# Patient Record
Sex: MALE | Race: BLACK OR AFRICAN AMERICAN | NOT HISPANIC OR LATINO | Employment: OTHER | URBAN - METROPOLITAN AREA
[De-identification: names, ages, dates, MRNs, and addresses within clinical notes are randomized per-mention and may not be internally consistent; named-entity substitution may affect disease eponyms.]

---

## 2020-11-27 ENCOUNTER — OFFICE VISIT (OUTPATIENT)
Dept: OPHTHALMOLOGY | Facility: CLINIC | Age: 65
End: 2020-11-27
Payer: MEDICARE

## 2020-11-27 DIAGNOSIS — H52.203 HYPEROPIA WITH ASTIGMATISM, BILATERAL: ICD-10-CM

## 2020-11-27 DIAGNOSIS — H25.13 NUCLEAR SCLEROSIS, BILATERAL: ICD-10-CM

## 2020-11-27 DIAGNOSIS — R73.03 BORDERLINE DIABETES: Primary | ICD-10-CM

## 2020-11-27 DIAGNOSIS — H40.013 OPEN ANGLE WITH BORDERLINE FINDINGS OF BOTH EYES: ICD-10-CM

## 2020-11-27 DIAGNOSIS — H52.03 HYPEROPIA WITH ASTIGMATISM, BILATERAL: ICD-10-CM

## 2020-11-27 PROCEDURE — 92004 COMPRE OPH EXAM NEW PT 1/>: CPT | Mod: S$GLB,,, | Performed by: OPTOMETRIST

## 2020-11-27 PROCEDURE — 92004 PR EYE EXAM, NEW PATIENT,COMPREHESV: ICD-10-PCS | Mod: S$GLB,,, | Performed by: OPTOMETRIST

## 2020-11-27 PROCEDURE — 99999 PR PBB SHADOW E&M-NEW PATIENT-LVL II: CPT | Mod: PBBFAC,,, | Performed by: OPTOMETRIST

## 2020-11-27 PROCEDURE — 92015 DETERMINE REFRACTIVE STATE: CPT | Mod: S$GLB,,, | Performed by: OPTOMETRIST

## 2020-11-27 PROCEDURE — 92015 PR REFRACTION: ICD-10-PCS | Mod: S$GLB,,, | Performed by: OPTOMETRIST

## 2020-11-27 PROCEDURE — 99999 PR PBB SHADOW E&M-NEW PATIENT-LVL II: ICD-10-PCS | Mod: PBBFAC,,, | Performed by: OPTOMETRIST

## 2020-11-27 RX ORDER — LOVASTATIN 10 MG/1
10 TABLET ORAL DAILY
COMMUNITY
Start: 2020-11-10

## 2020-11-27 RX ORDER — TRAZODONE HYDROCHLORIDE 50 MG/1
50 TABLET ORAL DAILY
COMMUNITY
Start: 2020-11-11

## 2020-11-27 RX ORDER — LOSARTAN POTASSIUM AND HYDROCHLOROTHIAZIDE 12.5; 1 MG/1; MG/1
1 TABLET ORAL DAILY
COMMUNITY
Start: 2020-11-11

## 2020-11-27 RX ORDER — MELOXICAM 15 MG/1
15 TABLET ORAL DAILY
COMMUNITY
Start: 2020-11-11

## 2020-11-27 RX ORDER — CYCLOBENZAPRINE HCL 10 MG
10 TABLET ORAL 2 TIMES DAILY PRN
COMMUNITY
Start: 2020-11-11

## 2020-11-27 RX ORDER — METFORMIN HYDROCHLORIDE 500 MG/1
500 TABLET ORAL 2 TIMES DAILY
COMMUNITY
Start: 2020-11-10

## 2020-11-27 RX ORDER — AMLODIPINE BESYLATE 10 MG/1
10 TABLET ORAL NIGHTLY
COMMUNITY
Start: 2020-11-11

## 2020-11-27 NOTE — PROGRESS NOTES
HPI     Pt here as a new pt. Last eye appt was 1973 in the . No diagnoses   of occular disease. Pt is borderline DM.     HPI    Any vision changes since last exam: None   Eye pain: None  Other ocular symptoms: None    Do you wear currently wear glasses or contacts? Reading glasses +1.25    Interested in contacts today? No    Do you plan on getting new glasses today? If needed        Last edited by Jose J Chatterjee, Patient Care Assistant on 11/27/2020  2:08   PM. (History)            Assessment /Plan     For exam results, see Encounter Report.  .  Borderline diabetes   Per pt history  No diabetic retinopathy in either eye  Continue close care with PCP   Monitor 12 months    Hyperopia with astigmatism, bilateral  Eyeglass Final Rx     Eyeglass Final Rx       Sphere Cylinder Axis    Right +1.25 +0.50 180    Left +1.25 +0.50 170    Type: SVL    Expiration Date: 11/28/2021            distance only  Ok to c/w OTC readers for near prn    Open angle with borderline findings of both eyes  Suspect based on ONH cupping/asymmetry   RTC for testing    Nuclear sclerosis, bilateral  Surgery is not indicated at this time.   Monitor 12 months.      RTC 1-6 wks for IOP check, 24-2VF, gOCT and pach  Discussed above and answered questions.

## 2024-04-18 ENCOUNTER — TELEPHONE (OUTPATIENT)
Dept: OPHTHALMOLOGY | Facility: CLINIC | Age: 69
End: 2024-04-18
Payer: MEDICARE

## 2024-04-18 NOTE — TELEPHONE ENCOUNTER
----- Message from Marquita Hayden sent at 4/17/2024  1:50 PM CDT -----  Type:  Appointment Request    Caller is requesting an appointment.      Name of Caller:  Pt's niece Reg    Symptoms:  routine eye exam    Would the patient rather a call back or a response via [employer]?   Call back    Best Call Back Number:   [phone]    Additional Information:  Reg is calling to see if Dr Burrows will see her uncle the same time as her Friday 4/19.  Please call back to advise. Thanks!

## 2024-04-18 NOTE — TELEPHONE ENCOUNTER
Spoke with pt inge Finney per Reg she requested both Marcus Vences and herself to cancel makings of potential appointments for reasons unknown/ personal

## 2024-09-23 ENCOUNTER — OFFICE VISIT (OUTPATIENT)
Dept: OPHTHALMOLOGY | Facility: CLINIC | Age: 69
End: 2024-09-23
Payer: MEDICARE

## 2024-09-23 DIAGNOSIS — E11.36 DIABETIC CATARACT: ICD-10-CM

## 2024-09-23 DIAGNOSIS — H40.013 AT LOW RISK FOR OPEN-ANGLE GLAUCOMA IN BOTH EYES: ICD-10-CM

## 2024-09-23 DIAGNOSIS — H52.7 REFRACTIVE ERRORS: ICD-10-CM

## 2024-09-23 DIAGNOSIS — E11.9 DIABETES MELLITUS TYPE 2 WITHOUT RETINOPATHY: Primary | ICD-10-CM

## 2024-09-23 PROCEDURE — 99999 PR PBB SHADOW E&M-EST. PATIENT-LVL I: CPT | Mod: PBBFAC,,, | Performed by: OPTOMETRIST

## 2024-09-23 PROCEDURE — 92015 DETERMINE REFRACTIVE STATE: CPT | Mod: S$GLB,,, | Performed by: OPTOMETRIST

## 2024-09-23 PROCEDURE — 3044F HG A1C LEVEL LT 7.0%: CPT | Mod: CPTII,S$GLB,, | Performed by: OPTOMETRIST

## 2024-09-23 PROCEDURE — 1159F MED LIST DOCD IN RCRD: CPT | Mod: CPTII,S$GLB,, | Performed by: OPTOMETRIST

## 2024-09-23 PROCEDURE — 92014 COMPRE OPH EXAM EST PT 1/>: CPT | Mod: S$GLB,,, | Performed by: OPTOMETRIST

## 2024-09-23 PROCEDURE — 4010F ACE/ARB THERAPY RXD/TAKEN: CPT | Mod: CPTII,S$GLB,, | Performed by: OPTOMETRIST

## 2024-09-23 PROCEDURE — 2023F DILAT RTA XM W/O RTNOPTHY: CPT | Mod: CPTII,S$GLB,, | Performed by: OPTOMETRIST

## 2024-09-23 NOTE — PROGRESS NOTES
SUBJECTIVE  Marcus Vences is 69 y.o. male  Corrected distance visual acuity was 20/30 in the right eye and 20/30 in the left eye. Corrected near visual acuity was J2 in the right eye and not recorded in the left eye.   Chief Complaint   Patient presents with    Annual Exam    Diabetic Eye Exam     Lab Results       Component                Value               Date                       HGBA1C                   5.6                 01/09/2024                     HPI     Diabetic Eye Exam     Additional comments: Lab Results       Component                Value               Date                       HGBA1C                   5.6                 01/09/2024                      Comments    New to [facility] today   States does not check BS often   VA blurry at distance and near at times   Denies flashes and floaters           Last edited by Jessee Valverde on 9/23/2024  9:23 AM.         Assessment /Plan :  1. Diabetes mellitus type 2 without retinopathy  No Background Diabetic Retinopathy  Strict BG control, f/u w/ PCP, and annual DFE  Stressed importance of DM control to preserve vision     2. Diabetic cataract  Cataracts are not visually significant and not affecting activities of daily living. Annual observation is recommended at this time. Patient to call or return to clinic with any significant change in vision prior to next visit.    3. At low risk for open-angle glaucoma in both eyes  Asymmetric C/D, low IOP, no family history POAG  Will schedule VF gOCT IOP ck    4. Refractive errors  Dispense Final Rx for glasses.  RTC 1 month VF gOCT IOP ck  Discussed above and answered questions.

## 2024-11-06 ENCOUNTER — OFFICE VISIT (OUTPATIENT)
Dept: OPHTHALMOLOGY | Facility: CLINIC | Age: 69
End: 2024-11-06
Payer: MEDICARE

## 2024-11-06 DIAGNOSIS — H40.013 AT LOW RISK FOR OPEN-ANGLE GLAUCOMA IN BOTH EYES: Primary | ICD-10-CM

## 2024-11-06 PROCEDURE — 2023F DILAT RTA XM W/O RTNOPTHY: CPT | Mod: CPTII,S$GLB,, | Performed by: OPTOMETRIST

## 2024-11-06 PROCEDURE — 99999 PR PBB SHADOW E&M-EST. PATIENT-LVL II: CPT | Mod: PBBFAC,,, | Performed by: OPTOMETRIST

## 2024-11-06 PROCEDURE — 1159F MED LIST DOCD IN RCRD: CPT | Mod: CPTII,S$GLB,, | Performed by: OPTOMETRIST

## 2024-11-06 PROCEDURE — 92133 CPTRZD OPH DX IMG PST SGM ON: CPT | Mod: S$GLB,,, | Performed by: OPTOMETRIST

## 2024-11-06 PROCEDURE — 4010F ACE/ARB THERAPY RXD/TAKEN: CPT | Mod: CPTII,S$GLB,, | Performed by: OPTOMETRIST

## 2024-11-06 PROCEDURE — G2211 COMPLEX E/M VISIT ADD ON: HCPCS | Mod: S$GLB,,, | Performed by: OPTOMETRIST

## 2024-11-06 PROCEDURE — 3044F HG A1C LEVEL LT 7.0%: CPT | Mod: CPTII,S$GLB,, | Performed by: OPTOMETRIST

## 2024-11-06 PROCEDURE — 92083 EXTENDED VISUAL FIELD XM: CPT | Mod: S$GLB,,, | Performed by: OPTOMETRIST

## 2024-11-06 PROCEDURE — 99213 OFFICE O/P EST LOW 20 MIN: CPT | Mod: S$GLB,,, | Performed by: OPTOMETRIST

## 2024-11-06 NOTE — PROGRESS NOTES
SUBJECTIVE  Marcus Vences is 69 y.o. male  Uncorrected distance visual acuity was 20/40 in the right eye and 20/25 in the left eye. Corrected near visual acuity was J2 in the right eye and J2 in the left eye. Comments: Checked near vision with glasses..   Chief Complaint   Patient presents with    Glaucoma Suspect     1 month VF, gOCT, IOP check           HPI     Glaucoma Suspect     Additional comments: 1 month VF, gOCT, IOP check            Comments    Patient states no visual complaints and no ocular pain/discomfort.          Last edited by Evelia Correa on 11/6/2024  9:44 AM.         Assessment /Plan :  1. At low risk for open-angle glaucoma in both eyes  - Posterior Segment OCT Optic Nerve- Both eyes  - Lemus Visual Field - OU - Extended - Both Eyes  gOCT shows TS and TI thinning OU  GCL shows symmetrical thinning OU  Normal VF OU    Slight elevation to IOP today  RTC 6 months IOP ck

## 2025-05-14 ENCOUNTER — OFFICE VISIT (OUTPATIENT)
Dept: OPHTHALMOLOGY | Facility: CLINIC | Age: 70
End: 2025-05-14
Payer: MEDICARE

## 2025-05-14 DIAGNOSIS — H40.013 AT LOW RISK FOR OPEN-ANGLE GLAUCOMA IN BOTH EYES: Primary | ICD-10-CM

## 2025-05-14 PROCEDURE — 92012 INTRM OPH EXAM EST PATIENT: CPT | Mod: S$GLB,,, | Performed by: OPTOMETRIST

## 2025-05-14 PROCEDURE — 3044F HG A1C LEVEL LT 7.0%: CPT | Mod: CPTII,S$GLB,, | Performed by: OPTOMETRIST

## 2025-05-14 PROCEDURE — 1159F MED LIST DOCD IN RCRD: CPT | Mod: CPTII,S$GLB,, | Performed by: OPTOMETRIST

## 2025-05-14 PROCEDURE — 99999 PR PBB SHADOW E&M-EST. PATIENT-LVL II: CPT | Mod: PBBFAC,,, | Performed by: OPTOMETRIST

## 2025-05-16 NOTE — PROGRESS NOTES
SUBJECTIVE  Marcus Vences is 69 y.o. male  Uncorrected distance visual acuity was 20/30 -2 in the right eye and 20/30 in the left eye. Uncorrected near visual acuity was J2 in the right eye and J2 in the left eye.   Chief Complaint   Patient presents with    Glaucoma Suspect     6 months IOP check           HPI     Glaucoma Suspect     Additional comments: 6 months IOP check            Comments    Patient states no visual complaints and no ocular pain/discomfort.          Last edited by Evelia Correa MA on 5/14/2025  8:01 AM.         Assessment /Plan :  1. At low risk for open-angle glaucoma in both eyes  Stable IOP OU without drops.  Educated Marcus Vences about maintaining IOP control, consistent uses of medications and frequent visits to assure the IOP control and best visual outcome.    RTC 6 months Full exam DFE